# Patient Record
Sex: FEMALE | Race: BLACK OR AFRICAN AMERICAN | Employment: UNEMPLOYED | URBAN - METROPOLITAN AREA
[De-identification: names, ages, dates, MRNs, and addresses within clinical notes are randomized per-mention and may not be internally consistent; named-entity substitution may affect disease eponyms.]

---

## 2020-11-16 ENCOUNTER — TELEPHONE (OUTPATIENT)
Dept: FAMILY MEDICINE CLINIC | Age: 25
End: 2020-11-16

## 2020-11-16 ENCOUNTER — VIRTUAL VISIT (OUTPATIENT)
Dept: FAMILY MEDICINE CLINIC | Age: 25
End: 2020-11-16
Payer: MEDICAID

## 2020-11-16 DIAGNOSIS — I10 ESSENTIAL HYPERTENSION: Primary | ICD-10-CM

## 2020-11-16 DIAGNOSIS — R77.8 ELEVATED TROPONIN: ICD-10-CM

## 2020-11-16 DIAGNOSIS — F33.9 RECURRENT DEPRESSION (HCC): ICD-10-CM

## 2020-11-16 DIAGNOSIS — J45.40 MODERATE PERSISTENT ASTHMA WITHOUT COMPLICATION: ICD-10-CM

## 2020-11-16 DIAGNOSIS — F41.9 ANXIETY: ICD-10-CM

## 2020-11-16 PROCEDURE — 99203 OFFICE O/P NEW LOW 30 MIN: CPT | Performed by: FAMILY MEDICINE

## 2020-11-16 RX ORDER — EPINEPHRINE 0.3 MG/.3ML
0.3 INJECTION SUBCUTANEOUS
COMMUNITY

## 2020-11-16 RX ORDER — DIPHENHYDRAMINE HCL 25 MG
25 CAPSULE ORAL
COMMUNITY

## 2020-11-16 RX ORDER — FLUTICASONE PROPIONATE AND SALMETEROL 100; 50 UG/1; UG/1
1 POWDER RESPIRATORY (INHALATION) EVERY 12 HOURS
COMMUNITY

## 2020-11-16 RX ORDER — DEXTROAMPHETAMINE SACCHARATE, AMPHETAMINE ASPARTATE, DEXTROAMPHETAMINE SULFATE AND AMPHETAMINE SULFATE 7.5; 7.5; 7.5; 7.5 MG/1; MG/1; MG/1; MG/1
30 TABLET ORAL
COMMUNITY

## 2020-11-16 RX ORDER — HYDROCHLOROTHIAZIDE 25 MG/1
25 TABLET ORAL DAILY
COMMUNITY

## 2020-11-16 RX ORDER — ALBUTEROL SULFATE 90 UG/1
AEROSOL, METERED RESPIRATORY (INHALATION)
COMMUNITY

## 2020-11-16 RX ORDER — METOPROLOL SUCCINATE 25 MG/1
25 TABLET, EXTENDED RELEASE ORAL DAILY
Qty: 30 TAB | Refills: 1 | Status: SHIPPED | OUTPATIENT
Start: 2020-11-16

## 2020-11-16 RX ORDER — QUETIAPINE FUMARATE 200 MG/1
200 TABLET, FILM COATED ORAL 2 TIMES DAILY
COMMUNITY

## 2020-11-16 RX ORDER — ATOMOXETINE 25 MG/1
25 CAPSULE ORAL DAILY
COMMUNITY

## 2020-11-16 RX ORDER — FLUOXETINE HYDROCHLORIDE 40 MG/1
CAPSULE ORAL DAILY
COMMUNITY

## 2020-11-16 NOTE — PROGRESS NOTES
Stevo Delcid is a 27 y.o. black female and presents with    Chief Complaint   Patient presents with    New Patient   [address] [facility], who was evaluated through a synchronous (real-time) audio-video encounter, and/or her healthcare decision maker, is aware that it is a billable service, with coverage as determined by her insurance carrier. She provided verbal consent to proceed: Yes, and patient identification was verified. It was conducted pursuant to the emergency declaration under the [facility], [address] [place] [address] and the [employer] Act. A caregiver was present when appropriate. Ability to conduct physical exam was limited. I was in the office. The patient was at home. Subjective:  Hypertension  Patient has recently been diagnosed with hypertension. Age at onset of elevated blood pressure:  24.  She indicates that she is feeling well and denies any symptoms referable to her elevated blood pressure. She was seen in the ER 3 times over the past 2 months. She had onset of chest pain and right lower quadrant 3 months ago. She is several months s/p appendectomy Specifically denies chest pain, palpitations, dyspnea, orthopnea, PND or peripheral edema. Current medication regimen is as listed   below. Patient has these side effects of medication: none. Cardiovascular risk factors: obesity, hypertension Use of agents associated with hypertension: none History of renal disease: negative; high bp 199/130; she is currently taking HCTZ 25 mg and lorazepam  History of flank trauma: negative  She has had right side head pain. Anxiety Review:  Patient is seen for anxiety disorder, social anxiety. Current treatment includes Prozac, atomoxetine and individual therapy.   She is followed by a therapist and PA  Ongoing symptoms include: palpitations, chest pain, shortness of breath, dizziness, paresthesias, insomnia, racing thoughts, psychomotor agitation, feelings of losing control, difficulty concentrating. Patient denies: suicidal ideation. Reported side effects from the treatment: none. Additional Concerns: she has asthma and is using advair daily for maintenance and albuterol as needed     ROS   General ROS: negative for - chills, fatigue or fever  Psychological ROS: positive for - anxiety  Ophthalmic ROS: negative for - blurry vision, decreased vision, double vision or itchy eyes  ENT ROS: negative for - headaches, nasal congestion or sore throat  Endocrine ROS: negative for - polydipsia/polyuria, temperature intolerance or unexpected weight changes  Respiratory ROS: no cough, shortness of breath, or wheezing  Cardiovascular ROS: no chest pain or dyspnea on exertion  Gastrointestinal ROS: no abdominal pain, change in bowel habits, or black or bloody stools  Genito-Urinary ROS: no dysuria, trouble voiding, or hematuria  Musculoskeletal ROS: negative for - joint pain or muscle pain  Neurological ROS: negative for - impaired coordination/balance, numbness/tingling or visual changes  Dermatological ROS: negative for - rash or skin lesion changes    All other systems reviewed and are negative. Objective: There were no vitals filed for this visit. alert, well appearing, and in no distress, oriented to person, place, and time and obese  Mental status - anxious  Chest - normal work of breathing  Neurological - cranial nerves II through XII intact  LABS     TESTS  EKG  Impression - NORMAL ECG -         Assessment/Plan:    1. Essential hypertension  Goal <130/80  - metoprolol succinate (TOPROL-XL) 25 mg XL tablet; Take 1 Tab by mouth daily. Dispense: 30 Tab; Refill: 1    2. Moderate persistent asthma without complication  Continue inhaled therapy    3. Anxiety  Discussed relaxation techniques    4. Recurrent depression ([facility] [address]  Continue medications and therapy    5.  Elevated troponin  Reviewed EKG; recommend stress test  - REFERRAL TO CARDIOLOGY      Lab review: labs reviewed, I note that hemogram normal, basic metabolic panel normal      I have discussed the diagnosis with the patient and the intended plan as seen in the above orders. I have discussed medication side effects and warnings with the patient as well. I have reviewed the plan of care with the patient, accepted their input and they are in agreement with the treatment goals.

## 2020-11-16 NOTE — TELEPHONE ENCOUNTER
Patient is already scheduled for next month. She called earlier this morning. She did state that she forgot to ask Dr. Teresa Flores for a refill on her One Touch Ultra 50 Glucose strips as well. Please advise.

## 2020-11-16 NOTE — TELEPHONE ENCOUNTER
Called patient to schedule face to face appointment in one month per Dr. Ethan Rosales.  If patient returns call please schedule thanks

## 2020-11-16 NOTE — PROGRESS NOTES
Patient presents in office today as new patient. Patient concerns: needs a referral for a cardiologist due to HTN.

## 2022-04-01 ENCOUNTER — TELEPHONE (OUTPATIENT)
Dept: PHYSICAL THERAPY | Age: 27
End: 2022-04-01

## 2022-04-04 ENCOUNTER — HOSPITAL ENCOUNTER (OUTPATIENT)
Dept: NUTRITION | Age: 27
End: 2022-04-04